# Patient Record
(demographics unavailable — no encounter records)

---

## 2024-12-12 NOTE — HISTORY OF PRESENT ILLNESS
[FreeTextEntry1] : 37 day old patient presents to the office with Congenital right ear deformity. noted at birth and not improving.  Born at 38 weeks, vaginal birth.  Parent denies complications with pregnancy or delivery.  Birth Weight : 5.9 Current Weight : 6.13 Breast milk.  No family history of ear deformities otherwise, healthy infant.  Parent reports normal feeding and elimination patterns. Normal development to this point.

## 2024-12-12 NOTE — ASSESSMENT
[FreeTextEntry1] : I, Dr. Duke Schmidt, personally performed the evaluation and management (E/M) services for this new patient.  That E/M includes conducting the clinically appropriate initial history &/or exam, assessing all conditions, and establishing the plan of care.  Today, my MISTI, ESTELA Daley, examined and treated the patient  following the established plan of care we created.

## 2024-12-12 NOTE — PROCEDURE
[FreeTextEntry6] : Dx:  Congenital ear deformity, right  Procedure:  Infant ear molding using a silicone prosthesis CPT- 47567Y		[id number]   Physician:  Duke Schmidt M.D  Anesthesia:  none  Complications;  none  Condition:  good  Clinical Summary: The patient was noted to have a right congenital ear deformity at birth, which has not improved. The patient is referred by pediatrician for correction of the deformity using infant ear molding.  There is a constricted ear deformity of the right ear that is amenable to ear molding.    Procedure Note: After completion of feeding, the baby was swaddled and laid on the left side. A silicone impression was taken using a molding putty.  A silicone prosthesis was then fabricated and large cradle and medium retainer of the appropriate sizewas placed.  Care was taken to confirm that there was no encroachment on the retroauricular sulcus and there was adequate dimension within the cradle for the full dimension of the pinna.  Hair was then shaved to leave approximately a one quarter of an inch boundary beyond the adhesive footplate of the posterior cradle. Skin prep was next done with alcohol pads to remove any residual skin oil. The posterior cradle was then slipped over the ear and the posterior conformer aligned precisely with the desired position of the superior limb of the triangular fossa. Care was taken to leave approximately a 1 mm space between the posterior conformer and the retroauricular sulcus. The pinna was then displaced back into the cradle to ensure that the superior limb of the triangular fossa was in perfect alignment with the anti-helical fold. Next the adhesive liners of the posterior cradle were removed allowing the cradle to be secured to the scalp. In addition it was necessary to bend the retractor so as to conform to the ideal shape of the helical rim. A conchal bowl conformer was then placed within the conchal bowl and used to invert the conchal mary jane deformity.   With these adjustments made the internal adhesive liners were removed and the retractor affixed to the inner surface of the cradle. tape was applied circumenferentially to maintain the silicone prosthesis.

## 2024-12-12 NOTE — PROCEDURE
[FreeTextEntry6] : Dx:  Congenital ear deformity, right  Procedure:  Infant ear molding using a silicone prosthesis CPT- 43435P		[id number]   Physician:  Duke Schmidt M.D  Anesthesia:  none  Complications;  none  Condition:  good  Clinical Summary: The patient was noted to have a right congenital ear deformity at birth, which has not improved. The patient is referred by pediatrician for correction of the deformity using infant ear molding.  There is a constricted ear deformity of the right ear that is amenable to ear molding.    Procedure Note: After completion of feeding, the baby was swaddled and laid on the left side. A silicone impression was taken using a molding putty.  A silicone prosthesis was then fabricated and large cradle and medium retainer of the appropriate sizewas placed.  Care was taken to confirm that there was no encroachment on the retroauricular sulcus and there was adequate dimension within the cradle for the full dimension of the pinna.  Hair was then shaved to leave approximately a one quarter of an inch boundary beyond the adhesive footplate of the posterior cradle. Skin prep was next done with alcohol pads to remove any residual skin oil. The posterior cradle was then slipped over the ear and the posterior conformer aligned precisely with the desired position of the superior limb of the triangular fossa. Care was taken to leave approximately a 1 mm space between the posterior conformer and the retroauricular sulcus. The pinna was then displaced back into the cradle to ensure that the superior limb of the triangular fossa was in perfect alignment with the anti-helical fold. Next the adhesive liners of the posterior cradle were removed allowing the cradle to be secured to the scalp. In addition it was necessary to bend the retractor so as to conform to the ideal shape of the helical rim. A conchal bowl conformer was then placed within the conchal bowl and used to invert the conchal mary jane deformity.   With these adjustments made the internal adhesive liners were removed and the retractor affixed to the inner surface of the cradle. tape was applied circumenferentially to maintain the silicone prosthesis.

## 2024-12-12 NOTE — PROCEDURE
[FreeTextEntry6] : Dx:  Congenital ear deformity, right  Procedure:  Infant ear molding using a silicone prosthesis CPT- 22081U		[id number]   Physician:  Duke Schmidt M.D  Anesthesia:  none  Complications;  none  Condition:  good  Clinical Summary: The patient was noted to have a right congenital ear deformity at birth, which has not improved. The patient is referred by pediatrician for correction of the deformity using infant ear molding.  There is a constricted ear deformity of the right ear that is amenable to ear molding.    Procedure Note: After completion of feeding, the baby was swaddled and laid on the left side. A silicone impression was taken using a molding putty.  A silicone prosthesis was then fabricated and large cradle and medium retainer of the appropriate sizewas placed.  Care was taken to confirm that there was no encroachment on the retroauricular sulcus and there was adequate dimension within the cradle for the full dimension of the pinna.  Hair was then shaved to leave approximately a one quarter of an inch boundary beyond the adhesive footplate of the posterior cradle. Skin prep was next done with alcohol pads to remove any residual skin oil. The posterior cradle was then slipped over the ear and the posterior conformer aligned precisely with the desired position of the superior limb of the triangular fossa. Care was taken to leave approximately a 1 mm space between the posterior conformer and the retroauricular sulcus. The pinna was then displaced back into the cradle to ensure that the superior limb of the triangular fossa was in perfect alignment with the anti-helical fold. Next the adhesive liners of the posterior cradle were removed allowing the cradle to be secured to the scalp. In addition it was necessary to bend the retractor so as to conform to the ideal shape of the helical rim. A conchal bowl conformer was then placed within the conchal bowl and used to invert the conchal mary jane deformity.   With these adjustments made the internal adhesive liners were removed and the retractor affixed to the inner surface of the cradle. tape was applied circumenferentially to maintain the silicone prosthesis.